# Patient Record
Sex: MALE | Race: WHITE | Employment: UNEMPLOYED | URBAN - NONMETROPOLITAN AREA
[De-identification: names, ages, dates, MRNs, and addresses within clinical notes are randomized per-mention and may not be internally consistent; named-entity substitution may affect disease eponyms.]

---

## 2020-08-10 ENCOUNTER — HOSPITAL ENCOUNTER (EMERGENCY)
Age: 45
Discharge: HOME | End: 2020-08-10
Attending: EMERGENCY MEDICINE

## 2020-08-10 VITALS
OXYGEN SATURATION: 97 % | BODY MASS INDEX: 27.09 KG/M2 | HEART RATE: 72 BPM | WEIGHT: 200 LBS | TEMPERATURE: 97.5 F | DIASTOLIC BLOOD PRESSURE: 75 MMHG | HEIGHT: 72 IN | SYSTOLIC BLOOD PRESSURE: 131 MMHG | RESPIRATION RATE: 20 BRPM

## 2020-08-10 PROCEDURE — 99282 EMERGENCY DEPT VISIT SF MDM: CPT

## 2020-08-10 RX ORDER — CLINDAMYCIN HYDROCHLORIDE 300 MG/1
300 CAPSULE ORAL 3 TIMES DAILY
Qty: 21 CAPSULE | Refills: 0 | Status: SHIPPED | OUTPATIENT
Start: 2020-08-10 | End: 2020-08-17

## 2020-08-10 RX ORDER — CLINDAMYCIN HYDROCHLORIDE 150 MG/1
300 CAPSULE ORAL ONCE
Status: DISCONTINUED | OUTPATIENT
Start: 2020-08-10 | End: 2020-08-10

## 2020-08-10 SDOH — HEALTH STABILITY: MENTAL HEALTH: HOW OFTEN DO YOU HAVE A DRINK CONTAINING ALCOHOL?: NEVER

## 2020-08-10 ASSESSMENT — ENCOUNTER SYMPTOMS
COLOR CHANGE: 0
TRISMUS: 0
EYE REDNESS: 0
SHORTNESS OF BREATH: 0

## 2020-08-10 ASSESSMENT — PAIN SCALES - GENERAL
PAINLEVEL_OUTOF10: 2
PAINLEVEL_OUTOF10: 3

## 2020-08-10 ASSESSMENT — PAIN DESCRIPTION - DESCRIPTORS
DESCRIPTORS: ACHING;DISCOMFORT
DESCRIPTORS: ACHING;DISCOMFORT

## 2020-08-10 ASSESSMENT — PAIN DESCRIPTION - ORIENTATION
ORIENTATION: LEFT
ORIENTATION: LEFT;LOWER;UPPER

## 2020-08-10 ASSESSMENT — PAIN DESCRIPTION - LOCATION
LOCATION: TEETH
LOCATION: TEETH

## 2020-08-10 ASSESSMENT — PAIN DESCRIPTION - PAIN TYPE
TYPE: ACUTE PAIN
TYPE: ACUTE PAIN

## 2020-08-10 ASSESSMENT — PAIN DESCRIPTION - FREQUENCY
FREQUENCY: INTERMITTENT
FREQUENCY: CONTINUOUS

## 2020-08-10 NOTE — ED PROVIDER NOTES
[facility]      Pt Name: Domonique Ramirez  MRN: [id number]  [place] 1975  Date of evaluation: 8/10/2020  Provider:  Nadeem Casillas MD                  HISTORY OF PRESENT ILLNESS   (Location/Symptom, Timing/Onset, Context/Setting, Quality, Duration, Modifying Factors, Severity)  Note limiting factors. Pt states he has been having left upper and lower dental pain that has been worsening x weeks. Pt states he is a smoker but it makes the pain worse. Pt denies having a dentist for a long time. Pt denies fever. Her denies other complaints at this time. He states he has had teeth removed in the past. Pt denies abd pain n/v or diarrhea. He denies CP or SOB. The history is provided by the patient. No  was used. Dental Pain   Location:  Upper and lower  Quality:  Constant  Onset quality:  Gradual  Duration:  3 weeks  Timing:  Constant  Progression:  Worsening  Chronicity:  New  Context: poor dentition    Relieved by:  Nothing  Worsened by: Hot food/drink and touching  Ineffective treatments:  None tried  Associated symptoms: no fever, no neck swelling and no trismus    Risk factors: lack of dental care and smoking            Nursing Notes were reviewed. REVIEW OF SYSTEMS    (2-9 systems for level 4, 10 or more for level 5)     Review of Systems   Constitutional: Negative for activity change and fever. HENT: Positive for dental problem. Eyes: Negative for redness. Respiratory: Negative for shortness of breath. Skin: Negative for color change. Neurological: Negative for syncope. Psychiatric/Behavioral: Negative for confusion. All other systems reviewed and are negative. PAST MEDICAL HISTORY   has no past medical history on file. PAST SURGICAL HISTORY   has no past surgical history on file. FAMILY HISTORY  family history is not on file. SOCIAL HISTORY   reports that he has never smoked.  He has never used smokeless tobacco. He reports that he does not drink alcohol or use drugs. HOME MEDICATIONS     Prior to Admission medications    Not on File        ALLERGIES  has No Known Allergies. PHYSICAL EXAM    (up to 7 for level 4, 8 or more for level 5)         ED TRIAGE VITALS      BP (!) 137/96   Pulse 72   Temp 97.5 °F (36.4 °C) (Oral)   Resp 20   Ht 6' (1.829 m)   Wt 200 lb (90.7 kg)   SpO2 97%   BMI 27.12 kg/m²         Physical Exam  Constitutional:       Appearance: He is well-developed. He is not ill-appearing or toxic-appearing. HENT:      Head: Normocephalic and atraumatic. Right Ear: Tympanic membrane and ear canal normal.      Left Ear: Tympanic membrane and ear canal normal.      Nose: Nose normal. No congestion or rhinorrhea. Mouth/Throat:      Mouth: Mucous membranes are moist.      Pharynx: Oropharynx is clear. No oropharyngeal exudate or posterior oropharyngeal erythema. Comments:   Left far posterior molar lower  Left second molar upper  Eyes:      Conjunctiva/sclera: Conjunctivae normal.      Pupils: Pupils are equal, round, and reactive to light. Neck:      Musculoskeletal: Normal range of motion and neck supple. Cardiovascular:      Rate and Rhythm: Normal rate and regular rhythm. Pulses: Normal pulses. Heart sounds: Normal heart sounds. No murmur. No friction rub. No gallop. Pulmonary:      Effort: Pulmonary effort is normal. No respiratory distress. Breath sounds: Normal breath sounds. No stridor. No wheezing, rhonchi or rales. Abdominal:      General: Abdomen is flat. Bowel sounds are normal.      Palpations: Abdomen is soft. Abdomen is not rigid. Tenderness: There is no abdominal tenderness. There is no guarding or rebound. Musculoskeletal: Normal range of motion. Right lower leg: No edema. Skin:     General: Skin is warm and dry. Capillary Refill: Capillary refill takes less than 2 seconds.    Neurological: General: No focal deficit present. Mental Status: He is alert and oriented to person, place, and time. GCS: GCS eye subscore is 4. GCS verbal subscore is 5. GCS motor subscore is 6. Cranial Nerves: No cranial nerve deficit. Sensory: No sensory deficit. Motor: No abnormal muscle tone. Coordination: Coordination normal.   Psychiatric:         Speech: Speech normal.         Behavior: Behavior normal.         Thought Content: Thought content normal.         DIAGNOSTIC RESULTS         RADIOLOGY:     No orders to display           LABS:              EKG interpreted by         PROCEDURES:  Procedures        Emergency Department Course:  3:37 PM EDT: I discussed with the patient continuing to refrain from smoking to return for any shortness of breath or drooling. Patient has no dysphonia dysphasia or dyspnea at this time. Placed on an antibiotic given dental referral sheet at this time there is no indication of a Kera's angina is not felt that any labs or imaging would be of benefit at this time. He is agreeable to plan voiced understanding of all instructions and is discharged in good condition. Discussed results, diagnosis and plan with patient and/or family. Questions addressed. Disposition and follow-up agreed upon. Specific discharge instructions explained. The patient and/or family and I have discussed the diagnosis and risks, and we agree with discharging home to follow-up with their primary care, specialist or referral doctor. We also discussed returning to the Emergency Department immediately if new or worsening symptoms occur. We have discussed the symptoms which are most concerning that necessitate immediate return. The Clinical Impression is odontalgia      I estimate there is LOW risk for a DEEP SPACE INFECTION (e.g., KATERINS ANGINA OR RETROPHARYNGEAL ABSCESS), MENINGITIS, or AIRWAY COMPROMISE, thus I consider the discharge disposition reasonable.  Also, there is no evidence or peritonitis, sepsis, or toxicity. No flowsheet data found. (Please note that portions of this note were completed with a voice recognition program.  Efforts were made to edit the dictations but occasionally words are mis-transcribed.)    Shelia Gómez (electronically signed)  Attending Emergency Physician      This document serves as a record of the services and decisions personally performed by myself, Yvonne Rios MD. It was created on my behalf by Shelia Gómez, 8/10/20   a trained medical scribe. The creation of this document is based on my statements to the medical scribe. This dictation was generated by voice recognition computer software. Although all attempts are made to edit the dictation for accuracy, there may be errors in the transcription that are not intended.             Yvonne Rios MD  08/10/20 1946

## 2021-05-25 ENCOUNTER — HOSPITAL ENCOUNTER (EMERGENCY)
Age: 46
Discharge: HOME | End: 2021-05-25
Attending: EMERGENCY MEDICINE

## 2021-05-25 VITALS
SYSTOLIC BLOOD PRESSURE: 135 MMHG | OXYGEN SATURATION: 97 % | RESPIRATION RATE: 16 BRPM | HEIGHT: 72 IN | TEMPERATURE: 98.9 F | DIASTOLIC BLOOD PRESSURE: 95 MMHG | BODY MASS INDEX: 29.8 KG/M2 | HEART RATE: 99 BPM | WEIGHT: 220 LBS

## 2021-05-25 DIAGNOSIS — R21 RASH AND OTHER NONSPECIFIC SKIN ERUPTION: ICD-10-CM

## 2021-05-25 DIAGNOSIS — R60.0 HAND EDEMA: Primary | ICD-10-CM

## 2021-05-25 PROCEDURE — 99283 EMERGENCY DEPT VISIT LOW MDM: CPT

## 2021-05-25 PROCEDURE — 6370000000 HC RX 637 (ALT 250 FOR IP): Performed by: EMERGENCY MEDICINE

## 2021-05-25 RX ORDER — PREDNISONE 20 MG/1
60 TABLET ORAL ONCE
Status: COMPLETED | OUTPATIENT
Start: 2021-05-25 | End: 2021-05-25

## 2021-05-25 RX ORDER — PREDNISONE 20 MG/1
60 TABLET ORAL DAILY
Qty: 12 TABLET | Refills: 0 | Status: SHIPPED | OUTPATIENT
Start: 2021-05-25 | End: 2021-05-29

## 2021-05-25 RX ADMIN — PREDNISONE 60 MG: 20 TABLET ORAL at 08:29

## 2021-05-25 ASSESSMENT — ENCOUNTER SYMPTOMS
NAUSEA: 0
SHORTNESS OF BREATH: 0
COLOR CHANGE: 0
VOMITING: 0
ABDOMINAL PAIN: 0
BLOOD IN STOOL: 0
PHOTOPHOBIA: 0
STRIDOR: 0
FACIAL SWELLING: 1
WHEEZING: 0
BACK PAIN: 0
VOICE CHANGE: 0
TROUBLE SWALLOWING: 0

## 2021-05-25 ASSESSMENT — PAIN SCALES - GENERAL: PAINLEVEL_OUTOF10: 2

## 2021-05-25 ASSESSMENT — PAIN DESCRIPTION - ORIENTATION: ORIENTATION: RIGHT;LEFT

## 2021-05-25 ASSESSMENT — PAIN DESCRIPTION - ONSET: ONSET: ON-GOING

## 2021-05-25 ASSESSMENT — PAIN DESCRIPTION - LOCATION: LOCATION: HAND

## 2021-05-25 ASSESSMENT — PAIN DESCRIPTION - DESCRIPTORS: DESCRIPTORS: TIGHTNESS

## 2021-05-25 NOTE — ED PROVIDER NOTES
[facility]  eMERGENCY dEPARTMENT eNCOUnter      Pt Name: Flavia Sánchez  MRN: [id number]  [place] 1975  Date of evaluation: 5/25/2021  Provider: Brayan Mallory MD    [address] [place] [address]       Chief Complaint   Patient presents with    Edema     c/o bilateral hand edema well as edema/rash to the head/face x2 days         HISTORY OF PRESENT ILLNESS   (Location/Symptom, Timing/Onset, Context/Setting, Quality, Duration, Modifying Factors, Severity)  Note limiting factors. Flavia Sánchez is a 39 y.o. male who denies any significant past medical history or known allergies who presents with 2 days of bilateral hand and face swelling. Patient also reports a blotchy red rash on his lower back. He denies any trouble breathing or throat swelling. He denies any fever, cough, shortness of breath, or decreased urination. Reports symptoms are mild, constant, and unchanged. HPI    Nursing Notes were reviewed. REVIEW OFSYSTEMS    (2-9 systems for level 4, 10 or more for level 5)     Review of Systems   Constitutional: Negative for appetite change, fever and unexpected weight change. HENT: Positive for facial swelling. Negative for trouble swallowing and voice change. Eyes: Negative for photophobia and visual disturbance. Respiratory: Negative for shortness of breath, wheezing and stridor. Cardiovascular: Negative for chest pain and palpitations. Gastrointestinal: Negative for abdominal pain, blood in stool, nausea and vomiting. Genitourinary: Negative for difficulty urinating and dysuria. Musculoskeletal: Positive for joint swelling. Negative for back pain, gait problem and neck pain. Skin: Positive for rash. Negative for color change and wound. Neurological: Negative for seizures, syncope and speech difficulty. Psychiatric/Behavioral: Negative for self-injury and suicidal ideas. Except as noted above the remainder of the review of systems was reviewed and negative. PAST MEDICAL HISTORY   History reviewed. No pertinent past medical history. SURGICAL HISTORY     History reviewed. No pertinent surgical history. CURRENT MEDICATIONS       Previous Medications    No medications on file       ALLERGIES     Patient has no known allergies. FAMILY HISTORY     History reviewed. No pertinent family history. SOCIAL HISTORY       Social History     Socioeconomic History    Marital status: Single     Spouse name: None    Number of children: None    Years of education: None    Highest education level: None   Occupational History    None   Tobacco Use    Smoking status: Never Smoker    Smokeless tobacco: Never Used   Vaping Use    Vaping Use: Never used   Substance and Sexual Activity    Alcohol use: Never    Drug use: Never    Sexual activity: Yes     Partners: Female   Other Topics Concern    None   Social History Narrative    None     Social Determinants of Health     Financial Resource Strain:     Difficulty of Paying Living Expenses:    Food Insecurity:     Worried About Running Out of Food in the Last Year:     Ran Out of Food in the Last Year:    Transportation Needs:     Lack of Transportation (Medical):      Lack of Transportation (Non-Medical):    Physical Activity:     Days of Exercise per Week:     Minutes of Exercise per Session:    Stress:     Feeling of Stress :    Social Connections:     Frequency of Communication with Friends and Family:     Frequency of Social Gatherings with Friends and Family:     Attends Restorationist Services:     Active Member of Clubs or Organizations:     Attends Club or Organization Meetings:     Marital Status:    Intimate Partner Violence:     Fear of Current or Ex-Partner:     Emotionally Abused:     Physically Abused:     Sexually Abused:          PHYSICAL EXAM    (up to 7 for level 4, 8 or more for level 5)     ED Triage Vitals [05/25/21 0817]   BP Temp Temp Source Pulse Resp SpO2 Height Weight (!) 135/95 98.9 °F (37.2 °C) Oral 106 18 99 % 6' (1.829 m) 220 lb (99.8 kg)       Physical Exam  Vitals and nursing note reviewed. Constitutional:       General: He is not in acute distress. Appearance: He is well-developed. HENT:      Head: Normocephalic and atraumatic. Comments: Mild diffuse facial edema. No sinus tenderness to palpation. Patient speaking normally, breathing normally, uvula midline and nonedematous. No signs of impending airway obstruction. Right Ear: External ear normal.      Left Ear: External ear normal.   Eyes:      Conjunctiva/sclera: Conjunctivae normal.   Neck:      Vascular: No JVD. Trachea: No tracheal deviation. Cardiovascular:      Rate and Rhythm: Normal rate. Pulmonary:      Effort: Pulmonary effort is normal. No respiratory distress. Breath sounds: Normal breath sounds. No wheezing. Abdominal:      General: There is no distension. Palpations: Abdomen is soft. Tenderness: There is no abdominal tenderness. There is no guarding or rebound. Musculoskeletal:         General: No tenderness. Normal range of motion. Cervical back: Neck supple. Skin:     General: Skin is warm and dry. Findings: Rash (Small red papules scattered diffusely across lower back consistent with bug bites) present. Neurological:      Mental Status: He is alert. Cranial Nerves: No cranial nerve deficit. EMERGENCY DEPARTMENT COURSE and DIFFERENTIAL DIAGNOSIS/MDM:   Vitals:    Vitals:    05/25/21 0817   BP: (!) 135/95   Pulse: 106   Resp: 18   Temp: 98.9 °F (37.2 °C)   TempSrc: Oral   SpO2: 99%   Weight: 220 lb (99.8 kg)   Height: 6' (1.829 m)         MDM  Patient is afebrile nontoxic-appearing. I primarily suspect seasonal allergies versus environmental allergies and suspect the rash is likely due to blood reaction to bug bites. I feel the patient is appropriate for steroids, over-the-counter antihistamines, and primary care follow-up. Strict ER return precautions are given for any difficulty breathing, worsening swelling, decreased urination, or fever. The patient expresses understanding and agreement with this plan is discharged home. I have considered anaphylaxis, kidney failure, or other emergent pathology and feel this is unlikely at this time. The patient was initially tachycardic on triage however upon being roomed his heart rate is in the upper 90s. There is no evidence of sepsis or acute endorgan damage on history or physical exam.         Procedures    FINAL IMPRESSION      1. Hand edema    2. Rash and other nonspecific skin eruption          DISPOSITION/PLAN   DISPOSITION Decision To Discharge 05/25/2021 08:25:41 AM      PATIENT REFERRED TO:  Wil Yang  [phone]  In 3 days  Ask for an appointment with a primary care doctor    Dakota Izaguirre. [facility] Emergency Department  [address] [place] [address],[address]  [phone]    If symptoms worsen      DISCHARGE MEDICATIONS:  New Prescriptions    PREDNISONE (DELTASONE) 20 MG TABLET    Take 3 tablets by mouth daily for 4 days          (Please note that portions of this note were completed with a voice recognition program.  Efforts were made to edit the dictations but occasionally words aremis-transcribed. )    Harvin Spurling, MD (electronically signed)  Attending Emergency Physician           Harvin Spurling, MD  05/25/21 8861

## 2021-05-29 ENCOUNTER — HOSPITAL ENCOUNTER (EMERGENCY)
Age: 46
Discharge: HOME | End: 2021-05-29
Attending: STUDENT IN AN ORGANIZED HEALTH CARE EDUCATION/TRAINING PROGRAM

## 2021-05-29 VITALS
SYSTOLIC BLOOD PRESSURE: 127 MMHG | HEIGHT: 72 IN | BODY MASS INDEX: 29.8 KG/M2 | RESPIRATION RATE: 16 BRPM | DIASTOLIC BLOOD PRESSURE: 84 MMHG | TEMPERATURE: 98 F | WEIGHT: 220 LBS | HEART RATE: 88 BPM | OXYGEN SATURATION: 97 %

## 2021-05-29 DIAGNOSIS — L50.9 URTICARIA: Primary | ICD-10-CM

## 2021-05-29 PROCEDURE — 99283 EMERGENCY DEPT VISIT LOW MDM: CPT

## 2021-05-29 RX ORDER — DIPHENHYDRAMINE HCL 25 MG
25 CAPSULE ORAL EVERY 6 HOURS PRN
Qty: 24 CAPSULE | Refills: 0 | Status: SHIPPED | OUTPATIENT
Start: 2021-05-29 | End: 2021-06-12

## 2021-05-29 RX ORDER — PREDNISONE 10 MG/1
TABLET ORAL
Qty: 45 TABLET | Refills: 0 | Status: SHIPPED | OUTPATIENT
Start: 2021-05-29

## 2021-05-29 RX ORDER — FAMOTIDINE 20 MG/1
20 TABLET, FILM COATED ORAL 2 TIMES DAILY
Qty: 28 TABLET | Refills: 0 | Status: SHIPPED | OUTPATIENT
Start: 2021-05-29 | End: 2021-06-12

## 2021-05-29 ASSESSMENT — ENCOUNTER SYMPTOMS
VOICE CHANGE: 0
FACIAL SWELLING: 0
TROUBLE SWALLOWING: 0
VOMITING: 0
WHEEZING: 0
DIARRHEA: 0
COUGH: 0
SHORTNESS OF BREATH: 0
STRIDOR: 0
SORE THROAT: 0

## 2021-05-29 NOTE — ED PROVIDER NOTES
education level: None   Occupational History    None   Tobacco Use    Smoking status: Current Every Day Smoker     Packs/day: 0.50     Types: Cigarettes    Smokeless tobacco: Current User     Types: Snuff   Vaping Use    Vaping Use: Never used   Substance and Sexual Activity    Alcohol use: Never    Drug use: Never    Sexual activity: Yes     Partners: Female   Other Topics Concern    None   Social History Narrative    None     Social Determinants of Health     Financial Resource Strain:     Difficulty of Paying Living Expenses:    Food Insecurity:     Worried About Running Out of Food in the Last Year:     Ran Out of Food in the Last Year:    Transportation Needs:     Lack of Transportation (Medical):  Lack of Transportation (Non-Medical):    Physical Activity:     Days of Exercise per Week:     Minutes of Exercise per Session:    Stress:     Feeling of Stress :    Social Connections:     Frequency of Communication with Friends and Family:     Frequency of Social Gatherings with Friends and Family:     Attends Anglican Services:     Active Member of Clubs or Organizations:     Attends Club or Organization Meetings:     Marital Status:    Intimate Partner Violence:     Fear of Current or Ex-Partner:     Emotionally Abused:     Physically Abused:     Sexually Abused:        SCREENINGS                            REVIEW OF SYSTEMS    (2-9 systems for level 4, 10 or more for level 5)   Review of Systems   Constitutional: Negative for chills and fever. HENT: Negative for facial swelling, sore throat, tinnitus, trouble swallowing and voice change. Respiratory: Negative for cough, shortness of breath, wheezing and stridor. Gastrointestinal: Negative for diarrhea and vomiting. Skin: Positive for rash.          PHYSICAL EXAM    (up to 7 for level 4, 8 or more for level 5)     ED Triage Vitals   BP Temp Temp src Pulse Resp SpO2 Height Weight   -- -- -- -- -- -- -- --       Physical Exam  Constitutional:       Appearance: Normal appearance. Cardiovascular:      Rate and Rhythm: Normal rate and regular rhythm. Pulmonary:      Effort: Pulmonary effort is normal. No respiratory distress. Breath sounds: No wheezing or rales. Skin:     Findings: Rash present. Neurological:      Mental Status: He is alert. DIAGNOSTIC RESULTS     EKG: All EKG's are interpreted by the Emergency Department Physician who either signs or Co-signs this chart in the absence of a cardiologist.          RADIOLOGY:   Non-plain film images such as CT, Ultrasound and MRI are read by the radiologist. Plain radiographic images are visualized and preliminarily interpreted by the emergency physician. Interpretation per the Radiologist below, if available at the time of this note:    No orders to display         LABS:  Labs Reviewed - No data to display    All other labs were within normal range or not returned as of this dictation. EMERGENCY DEPARTMENT COURSE and DIFFERENTIAL DIAGNOSIS/MDM:   Flavia Sánchez is a 42 y.o. male who presents to the emergency department with the complaint of urticaria, unknown source. No prior history. On arrival he is well-appearing, vitals are stable, SPO2 in the mid to upper 90s. Not tachycardic. There is no wheezes no stridor on evaluation of the oropharynx there is no posterior pharyngeal edema tongue swelling, is tolerating secretions. Does have diffuse urticaria. Will start patient on tapered dose of prednisone, Benadryl, Pepcid. Low suspicion for anaphylaxis at this time as he has no other symptoms aside from urticaria at presentation. Possibly related to changing detergents within the last 2 months, instructed to switch back to a known detergent, wash clothes, sheets. Given return precautions for worsening swelling, development of intraoral swelling difficulty breathing wheezing stridor.             CRITICAL CARE TIME   Total Critical Care time was 0 minutes, excluding separately reportable procedures. There was a high probability of clinically significant/life threatening deterioration in the patient's condition which required my urgent intervention. Clinical concern   Intervention     CONSULTS:  None    PROCEDURES:  Unless otherwise noted below, none     Procedures        FINAL IMPRESSION      1. Urticaria          DISPOSITION/PLAN   DISPOSITION  dc      PATIENT REFERRED TO:  [facility] Emergency Department  [address]    If symptoms worsen    [facility]) Pre-Services  [phone]          DISCHARGE MEDICATIONS:  New Prescriptions    DIPHENHYDRAMINE (BENADRYL) 25 MG CAPSULE    Take 1 capsule by mouth every 6 hours as needed for Itching    FAMOTIDINE (PEPCID) 20 MG TABLET    Take 1 tablet by mouth 2 times daily for 14 days    PREDNISONE (DELTASONE) 10 MG TABLET    Take 5 tablets (50 mg) 1 time daily for 3 days by mouth, then take 4 tablets (40 mg) 1 time daily for 3 days by mouth, then take 3 tablets (30 mg) 1 time daily for 3 days by mouth, then take 2 tablets (20 mg) 1 time daily for 3 days, then take 1 tablet (10 mg) 3 times daily     Controlled Substances Monitoring:     No flowsheet data found.     (Please note that portions of this note were completed with a voice recognition program.  Efforts were made to edit the dictations but occasionally words are mis-transcribed.)    Betzaida Contreras DO (electronically signed)  Attending Emergency Physician            Betzaida Contreras DO  05/29/21 [id number]